# Patient Record
Sex: MALE | Race: BLACK OR AFRICAN AMERICAN | Employment: PART TIME | URBAN - METROPOLITAN AREA
[De-identification: names, ages, dates, MRNs, and addresses within clinical notes are randomized per-mention and may not be internally consistent; named-entity substitution may affect disease eponyms.]

---

## 2019-07-30 ENCOUNTER — RECORDS - HEALTHEAST (OUTPATIENT)
Dept: LAB | Facility: HOSPITAL | Age: 44
End: 2019-07-30

## 2019-08-01 LAB
GAMMA INTERFERON BACKGROUND BLD IA-ACNC: 0.04 IU/ML
M TB IFN-G BLD-IMP: NEGATIVE
MITOGEN IGNF BCKGRD COR BLD-ACNC: -0.02 IU/ML
MITOGEN IGNF BCKGRD COR BLD-ACNC: -0.02 IU/ML
QTF INTERPRETATION: NORMAL
QTF MITOGEN - NIL: >10 IU/ML

## 2019-11-04 ENCOUNTER — HEALTH MAINTENANCE LETTER (OUTPATIENT)
Age: 44
End: 2019-11-04

## 2020-07-21 ENCOUNTER — TELEPHONE (OUTPATIENT)
Dept: NURSING | Facility: CLINIC | Age: 45
End: 2020-07-21

## 2020-07-21 ENCOUNTER — NURSE TRIAGE (OUTPATIENT)
Dept: NURSING | Facility: CLINIC | Age: 45
End: 2020-07-21

## 2020-07-21 DIAGNOSIS — Z11.59 SCREENING FOR VIRAL DISEASE: Primary | ICD-10-CM

## 2020-07-21 NOTE — TELEPHONE ENCOUNTER
"Patient is calling requesting COVID serologic antibody testing.  NOTE: Serologic testing is a blood test for 'antibodies' which are made at 10-14 days after you have had symptoms of COVID or were exposed and had an asymptomatic infection.  This does NOT test you for 'active' infection or tell you if you are contagious.    Are you a healthcare worker? no  Do you currently have a cough, fever, body aches, shortness of breath, or difficulty breathing?  No  Did you previously have cough, fever, body aches, shortness of breath, or difficulty breathing that have now resolved? No previous covid symptoms.   Have you been exposed to (or come into close contact with) someone who tested POSITIVE for COVID-19 or someone who had a possible case of COVID-19?  No exposure. Lab order placed per SARS-CoV-2 Serology test Standing Order using indication \"No exposure or symptoms\" and diagnosis code \"Screening for viral disease\" (Z11.59)      The patient was informed: \"Testing is limited each day and it may take time for testing to be available to everyone who has called. You will receive a call within 48-72 hours to schedule the serology testing. Please confirm the best number to reach you is [phone]. If you have any questions about scheduling, call [phone].\"     Angelia Arizmendi RN on 7/21/2020 at 4:26 PM      Reason for Disposition    COVID-19 Testing, questions about    Additional Information    Negative: SEVERE or constant chest pain or pressure (Exception: mild central chest pain, present only when coughing)    Negative: MODERATE difficulty breathing (e.g., speaks in phrases, SOB even at rest, pulse 100-120)    Negative: SEVERE difficulty breathing (e.g., struggling for each breath, speaks in single words)    Negative: Difficult to awaken or acting confused (e.g., disoriented, slurred speech)    Negative: Bluish (or gray) lips or face now    Negative: Shock suspected (e.g., cold/pale/clammy skin, too weak to stand, low BP, " rapid pulse)    Negative: Sounds like a life-threatening emergency to the triager    Negative: [1] COVID-19 exposure AND [2] no symptoms    Negative: COVID-19 and Breastfeeding, questions about    Negative: [1] Adult with possible COVID-19 symptoms AND [2] triager concerned about severity of symptoms or other causes    Negative: Patient sounds very sick or weak to the triager    Negative: MILD difficulty breathing (e.g., minimal/no SOB at rest, SOB with walking, pulse <100)    Negative: Chest pain or pressure    Negative: Fever > 103 F (39.4 C)    Negative: [1] Fever > 101 F (38.3 C) AND [2] age > 60    Negative: [1] Fever > 100.0 F (37.8 C) AND [2] bedridden (e.g., nursing home patient, CVA, chronic illness, recovering from surgery)    Negative: HIGH RISK patient (e.g., age > 64 years, diabetes, heart or lung disease, weak immune system)    Negative: Fever present > 3 days (72 hours)    Negative: [1] Fever returns after gone for over 24 hours AND [2] symptoms worse or not improved    Negative: [1] Continuous (nonstop) coughing interferes with work or school AND [2] no improvement using cough treatment per protocol    Negative: [1] COVID-19 infection suspected by caller or triager AND [2] mild symptoms (cough, fever, or others) AND [3] no complications or SOB    Negative: Cough present > 3 weeks    Negative: [1] COVID-19 diagnosed by positive lab test AND [2] mild symptoms (e.g., cough, fever, others) AND [3] no complications or SOB    Negative: [1] COVID-19 diagnosed by HCP (doctor, NP or PA) AND [2] mild symptoms (e.g., cough, fever, others) AND [3] no complications or SOB    Negative: COVID-19 Home Isolation, questions about    Protocols used: CORONAVIRUS (COVID-19) DIAGNOSED OR [id number] 5.16.20

## 2020-09-08 ENCOUNTER — OFFICE VISIT (OUTPATIENT)
Dept: URGENT CARE | Facility: URGENT CARE | Age: 45
End: 2020-09-08
Payer: OTHER MISCELLANEOUS

## 2020-09-08 VITALS
HEIGHT: 65 IN | WEIGHT: 160 LBS | SYSTOLIC BLOOD PRESSURE: 114 MMHG | DIASTOLIC BLOOD PRESSURE: 80 MMHG | OXYGEN SATURATION: 99 % | BODY MASS INDEX: 26.66 KG/M2 | HEART RATE: 71 BPM | TEMPERATURE: 98.3 F

## 2020-09-08 DIAGNOSIS — S09.90XA HEAD INJURY, INITIAL ENCOUNTER: Primary | ICD-10-CM

## 2020-09-08 DIAGNOSIS — S99.921A FOOT INJURY, RIGHT, INITIAL ENCOUNTER: ICD-10-CM

## 2020-09-08 PROCEDURE — 99204 OFFICE O/P NEW MOD 45 MIN: CPT | Performed by: FAMILY MEDICINE

## 2020-09-08 ASSESSMENT — MIFFLIN-ST. JEOR: SCORE: 1527.64

## 2020-09-08 NOTE — LETTER
September 8, 2020      Jules Hopkins  [address]  [place] [address]        To Whom It May Concern:    Jules Hopkins was seen in our clinic for an evaluation regarding a recent injury at work, it appears he may be having symptoms of a minor concussion. I recommend he take the remainder of this week off of from work . If symptoms resolve he can return to work early next week; if not, then he will need to have a follow-up appointment     Sincerely,        Jak Woods MD

## 2020-09-08 NOTE — PROGRESS NOTES
"Subjective:   Jules Hopkins is a 45 year old male who presents for   Chief Complaint   Patient presents with     Urgent Care     Pt in clinic to have eval for head pain and right foot pain due to fall at work on September 3rd, 2020.     Fall      Injury occurred 5 days at work where he fell after slipping on water. Right foot twisted and he fell backwards there was a chair behind him which he hit his head on. Denies upper extremity pain. No neck discomfort present.     He reports a present headache since the injury started. Hasn't tried medications but taking a nap makes it feel better. No changes in vision. No episodes of vomiting. No sensitivity to light or sound since the injury (has hx of sensitivity to light) . Frontal headache is how he would describe it. No hx of migraines or chronic headaches.     Denies hx of head injuries or concussion.     Workman's comp visit: only relevant medical info and history were included    Denies back pain.     Patient missed work today because of the headache.     R foot pain that has been prsent on top of foot. No visible bruising. Denies hx of fractures. No obvious swelling. Denies ankle discomfort.     Patient is not on blood thinners    ROS:   ROS: 10 point ROS neg other than the symptoms noted above in the HPI.      Objective:   /80   Pulse 71   Temp 98.3  F (36.8  C) (Oral)   Ht 1.651 m (5' 5\")   Wt 72.6 kg (160 lb)   SpO2 99%   BMI 26.63 kg/m  , Body mass index is 26.63 kg/m .  Gen:  NAD, well-nourished, sitting in chair comfortably  HEENT: EOMI, sclera anicteric, Head normocephalic, ; nares patent; moist mucous membranes  Neck: trachea midline, no thyromegaly, no c-spine tenderness  CV:  Hemodynamically stable, RRR  Pulm:  no increased work of breathing   ABD: soft, non-distended  Extrem: no cyanosis, edema or clubbing  Skin: no obvious rashes or abnormalities  Psych: Euthymic, linear thoughts, normal rate of speech  Back: full ROM in flexion/extension  MSK: " no muscle wasting  Neuro: no slurred speech, PERRL, romberg's negative  Gait: normal  R foot: no swelling, 2+ DP pulse, intact sensation to touch, full movement of all digits, no focal tenderness along previous area of reported pain  R ankle: no pain of either malleoli  No results found for any visits on 09/08/20.    Assessment & Plan:   Jules Hopkins, 45 year old male who presents with:  Head injury, initial encounter  Mild headaches that are intermittent, injury 5 days ago and has shown no sign of neurologic decline. We discussed that a minor concussion may have been sustained thus recommended holding out of work for remainder of week and encouraged rest/fluids and PRN tylenol for headaches. F/u as needed if symptoms not improving in the next few days. Concern for intracranial bleed or c-spine injury/fracture is low.     Foot injury, right, initial encounter  Contusion of foot suspected. NO obvious deficits or abnormalities on exam. Concern for fracture is low at this time. F/u as needed if symptoms return/persist.       Jak Woods MD   [facility]    The use of Dragon/[employer] dictation services may have been used to construct the content in this note; any grammatical or spelling errors are non-intentional. Please contact the author of this note directly if you are in need of any clarification.

## 2020-09-08 NOTE — PATIENT INSTRUCTIONS
Tylenol as needed every 4-6 hours for headache (325-650mg)       Drink 60-80 ounces of water a day to stay hydrated      Make appointment to see your doctor if your headaches are not improving in the next few days      If at any point you develop vomiting/imbalance/slurred speech/visual changes -- then come in immediately to be evaluated    If symptoms have resolved then okay to return to work next week

## 2020-11-22 ENCOUNTER — HEALTH MAINTENANCE LETTER (OUTPATIENT)
Age: 45
End: 2020-11-22

## 2021-09-19 ENCOUNTER — HEALTH MAINTENANCE LETTER (OUTPATIENT)
Age: 46
End: 2021-09-19

## 2022-01-08 ENCOUNTER — HEALTH MAINTENANCE LETTER (OUTPATIENT)
Age: 47
End: 2022-01-08

## 2022-11-20 ENCOUNTER — HEALTH MAINTENANCE LETTER (OUTPATIENT)
Age: 47
End: 2022-11-20

## 2023-04-15 ENCOUNTER — HEALTH MAINTENANCE LETTER (OUTPATIENT)
Age: 48
End: 2023-04-15

## 2025-04-23 NOTE — TELEPHONE ENCOUNTER
DIAGNOSIS: WC -Left elbow pain--pulling cart/self refer-via Risk Mgmt/no images     APPOINTMENT DATE: 4.29.25   NOTES STATUS DETAILS   MEDICATION LIST Internal

## 2025-04-28 NOTE — PROGRESS NOTES
ASSESSMENT/PLAN:    (M77.12) Lateral epicondylitis, left elbow  (primary encounter diagnosis)  Comment: exam consistent w/ lateral epicondylitis; will have him see OT and trial topical nsaid; he has no current work restrictions so he can follow-up with me prn; precautions given  Plan: Occupational Therapy  Referral, diclofenac (VOLTAREN) 1 % topical gel          Attestation:  This patient has been seen and evaluated by me, Parminder Damon MD with the resident, Dr Carr and the care team. I agree with the findings and plan of care as documented in this note.    Parminder Damon MD  April 29, 2025  9:56 AM        Pt is a 49 year old male here today for:     HPI:   Left Elbow pain :   Location: Lateral elbow    Duration: 3 weeks    Radiation: No   Numbness/ Tingling? No    Weakness? Yes    Swelling? No    Imaging? XR on 4/29/25   Treatment tried: Massage, HEP, icing/heat      Patient reports that he was at work; pulling the surgical instrument cart. It was heavy and he felt lateral elbow pain afterwards.  Pulling cart towards himself, felt electric pain over lateral elbow    Patient Active Problem List   Diagnosis    Oral aphthae    Condyloma acuminatum    Needle stick injury     No past medical history on file.   No past surgical history on file.   No current outpatient medications on file.      Allergies   Allergen Reactions    Nka [No Known Allergies]     Nkda [No Known Drug Allergy]       ROS:   Gen- no fevers/chills   Rheum - no morning stiffness   Derm - no rash/ redness   Neuro - no numbness, no tingling   Remainder of ROS negative.     Exam:   There were no vitals taken for this visit.       Left ELBOW:   Swelling: no   ROM: Flexion - Full/ extension - Full/ pronation - Full / supination- Full.   Strength: 5/5 w/ elbow flexion/ extension   Bony tenderness: very mild tenderness at medial epicondyle/ + tenderness lateral epicondyle/ - olecranon.   Neuro: Negative ulnar tinel test.   Maneuvers: No pain w/  resisted wrist flexion/ + pronation ; + pain w/ resisted wrist extension/ - supination/ long finger extension; No pain w/ valgus stress    Xray of L elbow on April 29, 2025 at [facility] [place] location - films personally reviewed with patient at time of visit     My impression: normal

## 2025-04-29 ENCOUNTER — ANCILLARY PROCEDURE (OUTPATIENT)
Dept: GENERAL RADIOLOGY | Facility: CLINIC | Age: 50
End: 2025-04-29
Attending: FAMILY MEDICINE

## 2025-04-29 ENCOUNTER — OFFICE VISIT (OUTPATIENT)
Dept: ORTHOPEDICS | Facility: CLINIC | Age: 50
End: 2025-04-29
Payer: OTHER MISCELLANEOUS

## 2025-04-29 ENCOUNTER — PRE VISIT (OUTPATIENT)
Dept: ORTHOPEDICS | Facility: CLINIC | Age: 50
End: 2025-04-29

## 2025-04-29 DIAGNOSIS — M25.522 LEFT ELBOW PAIN: ICD-10-CM

## 2025-04-29 DIAGNOSIS — M25.522 LEFT ELBOW PAIN: Primary | ICD-10-CM

## 2025-04-29 DIAGNOSIS — M77.12 LATERAL EPICONDYLITIS, LEFT ELBOW: Primary | ICD-10-CM

## 2025-04-29 PROCEDURE — 73080 X-RAY EXAM OF ELBOW: CPT | Mod: LT | Performed by: RADIOLOGY

## 2025-04-29 NOTE — LETTER
4/29/2025      RE: Jules Hopkins  [address]  [place] [address]     Dear Colleague,    Thank you for referring your patient, Jules Hopkins, to the [facility] [place]. Please see a copy of my visit note below.    ASSESSMENT/PLAN:    (M77.12) Lateral epicondylitis, left elbow  (primary encounter diagnosis)  Comment: exam consistent w/ lateral epicondylitis; will have him see OT and trial topical nsaid; he has no current work restrictions so he can follow-up with me prn; precautions given  Plan: Occupational Therapy  Referral, diclofenac (VOLTAREN) 1 % topical gel          Attestation:  This patient has been seen and evaluated by me, Parminder Damon MD with the resident, Dr Carr and the care team. I agree with the findings and plan of care as documented in this note.    Parminder Damon MD  April 29, 2025  9:56 AM        Pt is a 49 year old male here today for:     HPI:   Left Elbow pain :   Location: Lateral elbow    Duration: 3 weeks    Radiation: No   Numbness/ Tingling? No    Weakness? Yes    Swelling? No    Imaging? XR on 4/29/25   Treatment tried: Massage, HEP, icing/heat      Patient reports that he was at work; pulling the surgical instrument cart. It was heavy and he felt lateral elbow pain afterwards.  Pulling cart towards himself, felt electric pain over lateral elbow    Patient Active Problem List   Diagnosis     Oral aphthae     Condyloma acuminatum     Needle stick injury     No past medical history on file.   No past surgical history on file.   No current outpatient medications on file.      Allergies   Allergen Reactions     Nka [No Known Allergies]      Nkda [No Known Drug Allergy]       ROS:   Gen- no fevers/chills   Rheum - no morning stiffness   Derm - no rash/ redness   Neuro - no numbness, no tingling   Remainder of ROS negative.     Exam:   There were no vitals taken for this visit.       Left ELBOW:   Swelling: no   ROM: Flexion - Full/ extension  - Full/ pronation - Full / supination- Full.   Strength: 5/5 w/ elbow flexion/ extension   Bony tenderness: very mild tenderness at medial epicondyle/ + tenderness lateral epicondyle/ - olecranon.   Neuro: Negative ulnar tinel test.   Maneuvers: No pain w/ resisted wrist flexion/ + pronation ; + pain w/ resisted wrist extension/ - supination/ long finger extension; No pain w/ valgus stress    [place] [facility] [place] reviewed with patient at time of visit     My impression: normal       Again, thank you for allowing me to participate in the care of your patient.      Sincerely,    Parminder Damon MD

## 2025-05-10 ENCOUNTER — HEALTH MAINTENANCE LETTER (OUTPATIENT)
Age: 50
End: 2025-05-10

## 2025-05-14 NOTE — PROGRESS NOTES
"OCCUPATIONAL THERAPY EVALUATION  Type of Visit: Evaluation    See electronic medical record for Abuse and Falls Screening details.    Subjective   Presenting condition or subjective complaint:  Lateral epicondylitis of the left elbow  Date of onset: December 2024 (Order date: 4/29/25)    Relevant medical history:   No past medical history on file.    Dates & types of surgery:   No past surgical history on file.     Mechanism of injury: Patient was pulling a surgical instrument cart that was heavy. He pulled the cart toward himself and felt electric pain over the lateral elbow.    Prior diagnostic imaging/testing results: X-ray. Per report, \"No acute osseous abnormality. No substantial degenerative change.\"  Prior therapy history for the same diagnosis, illness or injury: None    Prior level of function:  Transfers: Independent  Ambulation: Independent  ADL: Independent  IADL: Independent     Living environment: Patient is independent at home and there are no concerns about accessibility and mobility in the home.    Employment:    Hobbies/Interests: Soccer, strength training    Patient goals for therapy: To be able to lift without elbow pain     Objective   Right hand dominant  Patient reports symptoms of pain    Pain Level (Scale 0-10)   5/14/2025   At Rest 0-2/10   With Use 5-8/10     Pain Description  Date 5/14/2025   Location Lateral elbow, extensor wad   Pain Quality Sharp, sore   Frequency Intermittent     Pain is worst  Daytime   Exacerbated by  Lifting   Relieved by Icing, exercises, wore a brace initially   Progression Gradually improving     Sensation  Within normal limits throughout all nerve distributions per patient report.    ROM  Elbow, wrist, and hand ROM is within normal limits bilaterally.    Resisted Testing  Pain Report:  - none    + mild    ++ moderate    +++ severe    5/14/2025   Elbow Extension -   Elbow Flexion -   Supination  +   Pronation -   Wrist Ext with RD, Elbow Ext " ++   Wrist Ext with UD, Elbow Ext +   Wrist Flex with RD, Elbow Ext -   Wrist Flex with UD, Elbow Ext -   EDC with Elbow Ext +   Long Finger Test +     Strength   (Measured in pounds)  Pain Report: - none  + mild    ++ moderate    +++ severe    5/14/2025 5/14/2025   Trials Right Left   1 78 70       5/14/2025 5/14/2025    Right Left   Elbow Ext 56+, pain at lateral epicondyle 67, discomfort at lateral epicondyle     Palpation  Pain Report:  - none    + mild    ++ moderate    +++ severe    5/14/2025   Proximal Triceps -   Spiral Groove -   Distal Triceps -   ECRB at LEP +   ECU at LEP +   EDC at LEP +   Radial Head -   Extensor Wad +   PIN Site +         Assessment & Plan   CLINICAL IMPRESSIONS  Medical Diagnosis: Lateral epicondylitis of the left elbow    Treatment Diagnosis: Left elbow pain    Impression/Assessment: Pt is a 50 year old male presenting to Occupational Therapy due to the above condition.  The following significant findings have been identified: Impaired activity tolerance, Impaired strength, and Pain.  These identified deficits interfere with their ability to perform work tasks, household chores, and meal planning and preparation as compared to previous level of function.   Patient's limitations or Problem List includes: Pain, Decreased , and Tightness in musculature of the left elbow which interferes with the patient's ability to perform Work Tasks, Recreational Activities, and Household Chores as compared to previous level of function.    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  Rationale: In order to maximize safety and independence with ADL/IADLs  Target Date: 07/15/25      Frequency of Treatment: 2x/month  Duration of Treatment: 2 months     Recommended Referrals to Other Professionals: N/A  Education Assessment: Learner/Method: Patient;No Barriers to Learning     Risks and benefits of evaluation/treatment have been explained.   Patient/Family/caregiver agrees with Plan of Care.     Evaluation Time:    OT Eval, Low Complexity Minutes (71076): 20      Signing Clinician: Lucila Mera OT

## 2025-05-15 ENCOUNTER — THERAPY VISIT (OUTPATIENT)
Dept: OCCUPATIONAL THERAPY | Facility: CLINIC | Age: 50
End: 2025-05-15
Payer: OTHER MISCELLANEOUS

## 2025-05-15 DIAGNOSIS — M77.12 LATERAL EPICONDYLITIS, LEFT ELBOW: ICD-10-CM

## 2025-05-15 DIAGNOSIS — M25.522 LEFT ELBOW PAIN: Primary | ICD-10-CM
